# Patient Record
Sex: MALE | Race: WHITE | NOT HISPANIC OR LATINO | Employment: FULL TIME | URBAN - METROPOLITAN AREA
[De-identification: names, ages, dates, MRNs, and addresses within clinical notes are randomized per-mention and may not be internally consistent; named-entity substitution may affect disease eponyms.]

---

## 2017-04-21 ENCOUNTER — OFFICE VISIT - HEALTHEAST (OUTPATIENT)
Dept: FAMILY MEDICINE | Facility: CLINIC | Age: 32
End: 2017-04-21

## 2017-04-21 DIAGNOSIS — Z30.09 ENCOUNTER FOR VASECTOMY COUNSELING: ICD-10-CM

## 2017-04-21 DIAGNOSIS — R53.83 FATIGUE: ICD-10-CM

## 2017-04-21 DIAGNOSIS — Z00.00 ROUTINE GENERAL MEDICAL EXAMINATION AT A HEALTH CARE FACILITY: ICD-10-CM

## 2017-04-21 DIAGNOSIS — E66.3 OVERWEIGHT (BMI 25.0-29.9): ICD-10-CM

## 2017-04-24 ENCOUNTER — COMMUNICATION - HEALTHEAST (OUTPATIENT)
Dept: FAMILY MEDICINE | Facility: CLINIC | Age: 32
End: 2017-04-24

## 2017-08-25 ENCOUNTER — OFFICE VISIT - HEALTHEAST (OUTPATIENT)
Dept: FAMILY MEDICINE | Facility: CLINIC | Age: 32
End: 2017-08-25

## 2017-08-25 DIAGNOSIS — J18.9 ATYPICAL PNEUMONIA: ICD-10-CM

## 2017-08-25 DIAGNOSIS — R05.9 COUGH: ICD-10-CM

## 2017-08-25 DIAGNOSIS — H61.20 CERUMEN IMPACTION: ICD-10-CM

## 2017-09-01 ENCOUNTER — COMMUNICATION - HEALTHEAST (OUTPATIENT)
Dept: SCHEDULING | Facility: CLINIC | Age: 32
End: 2017-09-01

## 2020-01-15 ENCOUNTER — OFFICE VISIT - HEALTHEAST (OUTPATIENT)
Dept: FAMILY MEDICINE | Facility: CLINIC | Age: 35
End: 2020-01-15

## 2020-01-15 DIAGNOSIS — E66.811 CLASS 1 OBESITY DUE TO EXCESS CALORIES WITHOUT SERIOUS COMORBIDITY WITH BODY MASS INDEX (BMI) OF 30.0 TO 30.9 IN ADULT: ICD-10-CM

## 2020-01-15 DIAGNOSIS — F41.9 ANXIETY: ICD-10-CM

## 2020-01-15 DIAGNOSIS — E66.09 CLASS 1 OBESITY DUE TO EXCESS CALORIES WITHOUT SERIOUS COMORBIDITY WITH BODY MASS INDEX (BMI) OF 30.0 TO 30.9 IN ADULT: ICD-10-CM

## 2020-01-15 DIAGNOSIS — Z00.00 ROUTINE GENERAL MEDICAL EXAMINATION AT A HEALTH CARE FACILITY: ICD-10-CM

## 2020-01-15 RX ORDER — ESCITALOPRAM OXALATE 10 MG/1
10 TABLET ORAL DAILY
Qty: 30 TABLET | Refills: 2 | Status: SHIPPED | OUTPATIENT
Start: 2020-01-15

## 2020-01-15 ASSESSMENT — ANXIETY QUESTIONNAIRES
IF YOU CHECKED OFF ANY PROBLEMS ON THIS QUESTIONNAIRE, HOW DIFFICULT HAVE THESE PROBLEMS MADE IT FOR YOU TO DO YOUR WORK, TAKE CARE OF THINGS AT HOME, OR GET ALONG WITH OTHER PEOPLE: SOMEWHAT DIFFICULT
1. FEELING NERVOUS, ANXIOUS, OR ON EDGE: NEARLY EVERY DAY
2. NOT BEING ABLE TO STOP OR CONTROL WORRYING: SEVERAL DAYS
7. FEELING AFRAID AS IF SOMETHING AWFUL MIGHT HAPPEN: SEVERAL DAYS
5. BEING SO RESTLESS THAT IT IS HARD TO SIT STILL: NOT AT ALL
GAD7 TOTAL SCORE: 11
3. WORRYING TOO MUCH ABOUT DIFFERENT THINGS: MORE THAN HALF THE DAYS
6. BECOMING EASILY ANNOYED OR IRRITABLE: NEARLY EVERY DAY
4. TROUBLE RELAXING: SEVERAL DAYS

## 2020-01-15 ASSESSMENT — MIFFLIN-ST. JEOR: SCORE: 2104.93

## 2020-01-15 ASSESSMENT — PATIENT HEALTH QUESTIONNAIRE - PHQ9: SUM OF ALL RESPONSES TO PHQ QUESTIONS 1-9: 3

## 2020-02-20 ENCOUNTER — COMMUNICATION - HEALTHEAST (OUTPATIENT)
Dept: FAMILY MEDICINE | Facility: CLINIC | Age: 35
End: 2020-02-20

## 2020-03-30 ENCOUNTER — RECORDS - HEALTHEAST (OUTPATIENT)
Dept: ADMINISTRATIVE | Facility: OTHER | Age: 35
End: 2020-03-30

## 2021-05-24 ENCOUNTER — RECORDS - HEALTHEAST (OUTPATIENT)
Dept: ADMINISTRATIVE | Facility: CLINIC | Age: 36
End: 2021-05-24

## 2021-05-27 ASSESSMENT — PATIENT HEALTH QUESTIONNAIRE - PHQ9: SUM OF ALL RESPONSES TO PHQ QUESTIONS 1-9: 3

## 2021-05-28 ASSESSMENT — ANXIETY QUESTIONNAIRES: GAD7 TOTAL SCORE: 11

## 2021-05-30 VITALS — BODY MASS INDEX: 29.25 KG/M2 | WEIGHT: 234 LBS

## 2021-05-31 VITALS — BODY MASS INDEX: 29.29 KG/M2 | WEIGHT: 234.31 LBS

## 2021-06-04 VITALS
WEIGHT: 243 LBS | HEIGHT: 75 IN | BODY MASS INDEX: 30.21 KG/M2 | HEART RATE: 73 BPM | DIASTOLIC BLOOD PRESSURE: 80 MMHG | OXYGEN SATURATION: 98 % | SYSTOLIC BLOOD PRESSURE: 120 MMHG

## 2021-06-05 NOTE — PROGRESS NOTES
Assessment/Plan:     1. Routine general medical examination at a health care facility  Routine healthcare maintenance.  Preventative cares reviewed.  Annual physical exams recommended to continue.    2. Class 1 obesity due to excess calories without serious comorbidity with body mass index (BMI) of 30.0 to 30.9 in adult  Dietary and exercise modification for weight goal less than 230 pounds initially, less than 220 pounds ideally.    3. Anxiety  Anxiety concerns especially associated with irritability.  Patient's wife interested in patient utilizing trial medication.  Initiate escitalopram 10 mg take half tablet daily and may titrate to 1 tablet daily as needed if tolerating without side effects.  ARTHUR 7 questionnaire 11 out of 21 for 3 out of 27.  - escitalopram oxalate (LEXAPRO) 10 MG tablet; Take 1 tablet (10 mg total) by mouth daily.  Dispense: 30 tablet; Refill: 2       The following high BMI interventions were performed this visit: encouragement to exercise, weight monitoring, weight loss from baseline weight and lifestyle education regarding diet.  Ensure ongoing efforts to achieve weight goal < 230 pounds initially, < 220 pounds ideally.              Subjective:      Chandra Jerez is a 34 y.o. male who presents for an annual exam.  Increased life stressors.  Balancing a lot of things and trying to multitask.  Often irritable with his wife.  Finds it more difficult to handle obligations.  Has 3 rental units.  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  Patient feels that he communicates better than his wife.  Not opposed to counseling.  Trying to stay as active as possible.  Patient's father also asking for assistance with his estate and financial management.  This is added additional stress in patient's wife.  Patient's mom both his sisters have anxiety concerns.  Prior labs dating back to April 21, 2017 with normal CBC, TSH and  comprehensive metabolic panel.  Comprehensive review of systems as above otherwise all negative.     - Thomas x 2011  1 girl (Kenisha)  1 boy (Elmer)  Employed - [employer] - 60 hours a week  Surgeries - appy, wisdom x2  Exercise - very little  Smoker - never  ETOH - never  Hobbies - carpentry, biking, boating  Family hx  Mother - living - thyroid issues,   Father - living - all and well  Siblings - 2 sisters, - all and well    Healthy Habits:   Regular Exercise: No  Healthy Diet: No  Dental Visits Regularly: Yes  Seat Belt: Yes   Sexually active: Yes  Colonoscopy: N/A  Lipid Profile: Yes  Glucose Screen: Yes    Immunization History   Administered Date(s) Administered     Tdap 07/17/2013     Immunization status: up to date and documented.  Vision Screening:both eyes  Hearing: PASS     Current Outpatient Medications   Medication Sig Dispense Refill     azithromycin (ZITHROMAX Z-TEDDY) 250 MG tablet Take 2 tablets on day 1 and 1 tablet on days 2-5 6 tablet 0     escitalopram oxalate (LEXAPRO) 10 MG tablet Take 1 tablet (10 mg total) by mouth daily. 30 tablet 2     No current facility-administered medications for this visit.      History reviewed. No pertinent past medical history.  Past Surgical History:   Procedure Laterality Date     [place] APPENDECTOMY      Description: Appendectomy;  Recorded: 09/25/2012;     [place] EXPLORATORY OF ABDOMEN      Description: Exploratory Laparotomy;  Recorded: 09/25/2012;  Comments: for ruptured appendicits.     Patient has no known allergies.  No family history on file.  Social History     Socioeconomic History     Marital status:      Spouse name: Not on file     Number of children: Not on file     Years of education: Not on file     Highest education level: Not on file   Occupational History     Not on file   Social Needs     Financial resource strain: Not on file     Food insecurity:     Worry: Not on file     Inability: Not on file     Transportation needs:     Medical:  "Not on file     Non-medical: Not on file   Tobacco Use     Smoking status: Never Smoker     Smokeless tobacco: Never Used   Substance and Sexual Activity     Alcohol use: Not on file     Drug use: Not on file     Sexual activity: Not on file   Lifestyle     Physical activity:     Days per week: Not on file     Minutes per session: Not on file     Stress: Not on file   Relationships     Social connections:     Talks on phone: Not on file     Gets together: Not on file     Attends [employer] service: Not on file     Active member of club or organization: Not on file     Attends meetings of clubs or organizations: Not on file     Relationship status: Not on file     Intimate partner violence:     Fear of current or ex partner: Not on file     Emotionally abused: Not on file     Physically abused: Not on file     Forced sexual activity: Not on file   Other Topics Concern     Not on file   Social History Narrative     Not on file       Review of Systems  Comprehensive ROS: as above, otherwise all negative.           Objective:     /80   Pulse 73   Ht 6' 2.5\" (1.892 m)   Wt (!) 243 lb (110.2 kg)   SpO2 98%   BMI 30.78 kg/m    Body mass index is 30.78 kg/m .    Physical    General Appearance:    Alert, cooperative, no distress, appears stated age.  Obesity with BMI 30.78.   Head:    Normocephalic, without obvious abnormality, atraumatic   Eyes:    PERRL, conjunctiva/corneas clear, EOM's intact, fundi     benign, both eyes        Ears:    Normal TM's and external ear canals, both ears   Nose:   Nares normal, septum midline, mucosa normal, no drainage    or sinus tenderness   Throat:   Lips, mucosa, and tongue normal; teeth and gums normal   Neck:   Supple, symmetrical, trachea midline, no adenopathy;        thyroid:  No enlargement/tenderness/nodules; no carotid    bruit or JVD   Back:     Symmetric, no curvature, ROM normal, no CVA tenderness   Lungs:     Clear to auscultation bilaterally, respirations unlabored "   Chest wall:    No tenderness or deformity   Heart:    Regular rate and rhythm, S1 and S2 normal, no murmur, rub   or gallop   Abdomen:     Soft, non-tender, bowel sounds active all four quadrants,     no masses, no organomegaly.     Genitalia:    Normal male without lesion, discharge or tenderness.  No inguinal hernia noted.     Rectal:    deferred   Extremities:   Extremities normal, atraumatic, no cyanosis or edema   Pulses:   2+ and symmetric all extremities   Skin:   Skin color, texture, turgor normal, no rashes or lesions   Lymph nodes:   Cervical, supraclavicular, and axillary nodes normal   Neurologic:   CNII-XII intact. Normal strength, sensation and reflexes       throughout                This note has been dictated using voice recognition software and as a result may contain minor grammatical errors and unintended word substitutions.

## 2021-06-06 NOTE — TELEPHONE ENCOUNTER
Reason contacted:  symptom  Information relayed:  Below message. Patient will schedule an appointment if pain does not improve over the weekend.   Additional questions:  No  Further follow-up needed:  No  Okay to leave a detailed message:  No

## 2021-06-06 NOTE — TELEPHONE ENCOUNTER
Ibuprofen 800 mg three times daily as needed.  Take with food/milk in order to avoid stomach upset.  Office visit if persistent concern or if worsening.

## 2021-06-06 NOTE — TELEPHONE ENCOUNTER
Who is calling:  Patient's wifeThomas  Reason for Call:    Patient wife states patient states that his left ear has hurt since it was cleaned at his appointment with Provider on 1/15/2020.  Does Provider have any recommendations on what to do to lessen the pain?  Please reach out to patient at [phone]  Date of last appointment with primary care: 1/15/2020  Okay to leave a detailed message: Yes

## 2021-06-10 NOTE — PROGRESS NOTES
Assessment:     1. Routine general medical examination at a health care facility     2. Overweight (BMI 25.0-29.9)     3. Fatigue  HM2(CBC w/o Differential)    Thyroid Stimulating Hormone (TSH)    Comprehensive Metabolic Panel   4. Encounter for vasectomy counseling          Plan:      Routine healthcare maintenance.  Preventative cares reviewed.  Did discuss potential future vasectomy however patient will review and schedule contraception counseling visit in 6-12 months when ready.  Likely will benefit from low-dose benzodiazepine prior to procedure.  The check CBC, TSH and conference metabolic panel regarding fatigue issues.  Notify with results otherwise feel related to deconditioning as well as having  at home with significant sleep disruption.  Denies snoring concerns otherwise consider sleep study if persistent issues.  Recommend annual physical exam.  Nonfasting today.  Prior lipid cascade normal 2012.  Immunizations reviewed and up-to-date.    The following high BMI interventions were performed this visit: encouragement to exercise, weight monitoring, weight loss from baseline weight and lifestyle education regarding diet.  Weight goal < 220 pounds initially, < 210 pounds ideally.       Subjective:      Chandra Jerez is a 32 y.o. male who presents for an annual exam.  Establishing care.  Doing well in general however does have some fatigue issues and patient's wife states that patient has family history of thyroid disorder in his mother.  No cold intolerance.  No constipation.  No diarrhea.  No difficulty swallowing.  No change in voice.  Eventually wants to have permanent sterilization however hesitant to have vasectomy currently.  Has 2 children.  Not exercising on a consistent basis.  Understands need to improve diet.    Healthy Habits:   Regular Exercise: No  Healthy Diet: improving  Dental Visits Regularly: Yes  Seat Belt: Yes   Sexually active: Yes  Colonoscopy: N/A  Lipid Profile:  Yes  Glucose Screen: Yes    Immunization History   Administered Date(s) Administered     Tdap 07/17/2013     Immunization status: up to date and documented.  Vision Screening:both eyes  Hearing: PASS     No current outpatient prescriptions on file.     No current facility-administered medications for this visit.      History reviewed. No pertinent past medical history.  Past Surgical History:   Procedure Laterality Date     [place] APPENDECTOMY      Description: Appendectomy;  Recorded: 09/25/2012;     [place] EXPLORATORY OF ABDOMEN      Description: Exploratory Laparotomy;  Recorded: 09/25/2012;  Comments: for ruptured appendicits.     Review of patient's allergies indicates no known allergies.  History reviewed. No pertinent family history.  Social History     Social History     Marital status:      Spouse name: N/A     Number of children: N/A     Years of education: N/A     Occupational History     Not on file.     Social History Main Topics     Smoking status: Never Smoker     Smokeless tobacco: Not on file     Alcohol use Not on file     Drug use: Not on file     Sexual activity: Not on file     Other Topics Concern     Not on file     Social History Narrative       Review of Systems  Comprehensive ROS: as above, otherwise all negative.           Objective:     /72  Pulse 80  Wt (!) 234 lb (106.1 kg)  BMI 29.25 kg/m2  Body mass index is 29.25 kg/(m^2).    Physical    General Appearance:    Alert, cooperative, no distress, appears stated age.  Overweight   Head:    Normocephalic, without obvious abnormality, atraumatic   Eyes:    PERRL, conjunctiva/corneas clear, EOM's intact, fundi     benign, both eyes        Ears:    Normal TM's and external ear canals, both ears   Nose:   Nares normal, septum midline, mucosa normal, no drainage    or sinus tenderness   Throat:   Lips, mucosa, and tongue normal; teeth and gums normal   Neck:   Supple, symmetrical, trachea midline, no adenopathy;        thyroid:  No  enlargement/tenderness/nodules; no carotid    bruit or JVD   Back:     Symmetric, no curvature, ROM normal, no CVA tenderness   Lungs:     Clear to auscultation bilaterally, respirations unlabored   Chest wall:    No tenderness or deformity   Heart:    Regular rate and rhythm, S1 and S2 normal, no murmur, rub   or gallop   Abdomen:     Soft, non-tender, bowel sounds active all four quadrants,     no masses, no organomegaly.     Genitalia:    Normal male without lesion, discharge or tenderness.  No inguinal hernia noted.     Rectal:    deferred   Extremities:   Extremities normal, atraumatic, no cyanosis or edema   Pulses:   2+ and symmetric all extremities   Skin:   Skin color, texture, turgor normal, no rashes or lesions   Lymph nodes:   Cervical, supraclavicular, and axillary nodes normal   Neurologic:   CNII-XII intact. Normal strength, sensation and reflexes       throughout

## 2021-06-12 NOTE — PROGRESS NOTES
ASSESSMENT/PLAN:   1. Atypical pneumonia  azithromycin (ZITHROMAX Z-TEDDY) 250 MG tablet   2. Cough  XR Chest PA and Lateral    Bordetella pertussis PCR    albuterol nebulizer solution 3 mL (PROVENTIL)   3. Cerumen impaction  Nursing communication     Due to duration of cough and crackles on exam, CXR ordered to evaluate further.  CXR was negative for infiltrate. However based on lung exam with crackles, I do suspect atypical pneumonia.   Patient did not have appreciable improvement or symptoms or lung exam after albuterol neb in clinic today. Less suspicion for bronchitis with this, and also in absence of wheezing on exam.  Do not suspect pertussis, however given cough's duration, appropriate to consider and test. I am treating with azithromycin for atypical pneumonia, rather than for pertussis, however. I do not think empiric isolation is necessary. He knows that if the test comes back positive, he will need to be in isolation until completing course.    At the end of the encounter, I discussed results, diagnosis, medications. Discussed red flags for immediate return to clinic/ER, as well as indications for follow up if no improvement. Patient understood and agreed to plan. Patient was stable for discharge.    *I was masked during all patient interactions. Patient was also masked from time of registration.        Patient Instructions:  Patient Instructions   You most likely have walking pneumonia.    1.  Take azithromycin as prescribed for 5 days.  Take with food.  Take a probiotic while on the antibiotic.  2.  We will call with results of the whooping cough test.  If positive, please finish the entire course of azithromycin and going to isolation until finishing the antibiotic.  If negative, still finished the course, however no need for isolation.  3.  May continue to take over-the-counter cough indications such as Mucinex as desired.  4.  Follow-up with your primary care provider if no improvement in 1 week,  "sooner if worsening. Developing chest pain, shortness of breath, coughing up blood, or any other new, concerning symptoms, go to the ER immediately.                    SUBJECTIVE:   Chandra Jerez is a 32 y.o. male who presents today for evaluation of cough for 2 weeks. It is getting worse. He describes cough at productive initially, but now is dry. He describes a \"constant tickle in his upper chest.\"  It is worse at night and in the morning right when waking up. It is keeping him up at night. No post-tussive emesis. He admits to some wheezing. He admits to mild shortness of breath \"all the time\" but says \"nothing scary.\" He admits to sore throat in the morning, as well as nasal congestion. No fever. No sinus pressure. No nausea, vomiting. No malaise. He admits to fatigue.  No hemoptysis. No chest pain. No lightheadedness or syncope. No calf pain or swelling. No PMH blood clots. No recent surgeries or travel. No known ill contacts.    He tried some OTC cough medication which did not help at all.          Past Medical History:  Patient Active Problem List   Diagnosis     Overweight (BMI 25.0-29.9)       Surgical History:  Past Surgical History:   Procedure Laterality Date     [place] APPENDECTOMY      Description: Appendectomy;  Recorded: 09/25/2012;     [place] EXPLORATORY OF ABDOMEN      Description: Exploratory Laparotomy;  Recorded: 09/25/2012;  Comments: for ruptured appendicits.     Reviewed; Non-contributory      Family History:  Reviewed; Non-contributory      Social History:    History   Smoking Status     Never Smoker   Smokeless Tobacco     Not on file     Smoking: none  Alcohol use: once/month  Other drug use: none  Occupation: real estate sales      Current Medications:  No current outpatient prescriptions on file prior to visit.     No current facility-administered medications on file prior to visit.        Allergies:   No Known Allergies    I personally reviewed patient's past medical, surgical, social, family " history and allergies.    ROS:  Review of Systems  Complete 8pt ROS in HPI, otherwise negative.      OBJECTIVE:   /78 (Patient Site: Right Arm, Patient Position: Sitting, Cuff Size: Adult Large)  Pulse 74  Temp 99.1  F (37.3  C) (Oral)   Resp 18  Wt (!) 234 lb 5 oz (106.3 kg)  SpO2 96%  BMI 29.29 kg/m2      General Appearance:  Alert, well-appearing male in NAD. Afebrile.    Integument: Warm, dry.  HEENT:  Head: Atraumatic, normocephalic. Face nontraumatic.  Eyes: Conjunctiva clear, Lids normal.  Ears:  Bilateral cerumen impaction.  Nose: nares patent. No erythema of nasal mucosa. No rhinorrhea.  Oropharynx:  No trismus. No posterior pharyngeal erythema. No petechiae, no exudate. No tonsillar hypertrophy. Uvula midline. Moist mucus membranes.  Neck: Supple, no lymphadenopathy.   Respiratory: No distress. Equal inspiration to bilateral bases. Fine crackles in bilateral upper lung fields, L>R. No wheezes.  Cardiovascular: Regular rate and rhythm, no murmur, rub or gallop. Peripheral pulses 2+ bilaterally. No peripheral edema.  Musculoskeletal: No calf tenderness.           Radiology:  I personally ordered and viewed this study. I agree with below radiology findings.    Xr Chest Pa And Lateral    Result Date: 8/25/2017  XR CHEST PA AND LATERAL 8/25/2017 4:41 PM INDICATION: cough COMPARISON: None. FINDINGS: Negative chest. This report was electronically interpreted by: Dr. Adarsh Garcia MD ON 08/25/2017 at 16:58

## 2021-06-15 PROBLEM — E66.3 OVERWEIGHT: Status: ACTIVE | Noted: 2017-04-21

## 2022-07-26 ENCOUNTER — TELEPHONE (OUTPATIENT)
Dept: FAMILY MEDICINE | Facility: CLINIC | Age: 37
End: 2022-07-26

## 2022-07-26 ENCOUNTER — LAB (OUTPATIENT)
Dept: LAB | Facility: CLINIC | Age: 37
End: 2022-07-26
Payer: COMMERCIAL

## 2022-07-26 DIAGNOSIS — Z01.83 BLOOD TYPING ENCOUNTER: Primary | ICD-10-CM

## 2022-07-26 DIAGNOSIS — Z01.83 BLOOD TYPING ENCOUNTER: ICD-10-CM

## 2022-07-26 PROCEDURE — 86900 BLOOD TYPING SEROLOGIC ABO: CPT

## 2022-07-26 PROCEDURE — 86901 BLOOD TYPING SEROLOGIC RH(D): CPT

## 2022-07-26 PROCEDURE — 36415 COLL VENOUS BLD VENIPUNCTURE: CPT

## 2022-07-26 NOTE — TELEPHONE ENCOUNTER
Pt's wife called requesting lab orders. Pt need to have draw to find out blood type. Please call wife, Thomas, or pt when placed so they can schedule.

## 2022-07-26 NOTE — TELEPHONE ENCOUNTER
Spoke with Wife.  El had his blood drawn earlier this morning at the [facility] lab.  They just need an order.   Does not want any other labs.  I did discuss that if he were to donate blood, they would be able to find out the blood type for free as insurance sometimes does not cover blood type test.  Wife is pregnant and sees Dr Contreras and it was discussed that they should possibly get their blood types checked in order to know if they need Rhogam shots.  In 2016 when he saw Dr Marquez he also was curious about this issue and was going to donate blood to find out.    I will pend order for you.   Can you review and sign off if you agree?  I also chose the best diagnosis I could.    I order it as an add-on and put in the lab comments that it was drawn at the [facility] lab.    Thank you

## 2022-07-26 NOTE — TELEPHONE ENCOUNTER
Please determine why it is necessary to find out his blood type and if other labs are being requested.

## 2022-07-27 LAB
ABO/RH(D): NORMAL
SPECIMEN EXPIRATION DATE: NORMAL